# Patient Record
Sex: MALE | Race: WHITE | Employment: OTHER | URBAN - NONMETROPOLITAN AREA
[De-identification: names, ages, dates, MRNs, and addresses within clinical notes are randomized per-mention and may not be internally consistent; named-entity substitution may affect disease eponyms.]

---

## 2022-01-31 RX ORDER — CYCLOBENZAPRINE HCL 10 MG
10 TABLET ORAL 3 TIMES DAILY PRN
COMMUNITY
Start: 2021-12-04 | End: 2022-02-01

## 2022-01-31 RX ORDER — LISINOPRIL 20 MG/1
20 TABLET ORAL DAILY
COMMUNITY

## 2022-01-31 RX ORDER — TAMSULOSIN HYDROCHLORIDE 0.4 MG/1
0.4 CAPSULE ORAL DAILY
COMMUNITY
Start: 2022-01-17

## 2022-01-31 RX ORDER — METOPROLOL TARTRATE 50 MG/1
50 TABLET, FILM COATED ORAL 2 TIMES DAILY
COMMUNITY

## 2022-01-31 RX ORDER — HYDROCHLOROTHIAZIDE 25 MG/1
25 TABLET ORAL DAILY
COMMUNITY

## 2022-01-31 RX ORDER — FENOFIBRATE 145 MG/1
145 TABLET, COATED ORAL DAILY
COMMUNITY

## 2022-02-01 ENCOUNTER — OFFICE VISIT (OUTPATIENT)
Dept: NEPHROLOGY | Age: 67
End: 2022-02-01
Payer: MEDICARE

## 2022-02-01 VITALS
DIASTOLIC BLOOD PRESSURE: 65 MMHG | SYSTOLIC BLOOD PRESSURE: 139 MMHG | HEART RATE: 63 BPM | OXYGEN SATURATION: 95 % | BODY MASS INDEX: 40.74 KG/M2 | WEIGHT: 313 LBS

## 2022-02-01 DIAGNOSIS — N28.1 RENAL CYST, LEFT: ICD-10-CM

## 2022-02-01 DIAGNOSIS — N18.32 STAGE 3B CHRONIC KIDNEY DISEASE (HCC): Primary | ICD-10-CM

## 2022-02-01 DIAGNOSIS — I12.9 HYPERTENSIVE RENAL DISEASE, STAGE 1 THROUGH STAGE 4 OR UNSPECIFIED CHRONIC KIDNEY DISEASE: ICD-10-CM

## 2022-02-01 PROBLEM — R39.12 BENIGN PROSTATIC HYPERPLASIA WITH WEAK URINARY STREAM: Status: ACTIVE | Noted: 2021-12-16

## 2022-02-01 PROBLEM — I48.91 ATRIAL FIBRILLATION WITH RAPID VENTRICULAR RESPONSE (HCC): Status: ACTIVE | Noted: 2018-09-06

## 2022-02-01 PROBLEM — N40.1 BENIGN PROSTATIC HYPERPLASIA WITH WEAK URINARY STREAM: Status: ACTIVE | Noted: 2021-12-16

## 2022-02-01 PROCEDURE — 99204 OFFICE O/P NEW MOD 45 MIN: CPT | Performed by: INTERNAL MEDICINE

## 2022-02-01 PROCEDURE — 1123F ACP DISCUSS/DSCN MKR DOCD: CPT | Performed by: INTERNAL MEDICINE

## 2022-02-01 PROCEDURE — G8427 DOCREV CUR MEDS BY ELIG CLIN: HCPCS | Performed by: INTERNAL MEDICINE

## 2022-02-01 PROCEDURE — G8484 FLU IMMUNIZE NO ADMIN: HCPCS | Performed by: INTERNAL MEDICINE

## 2022-02-01 PROCEDURE — 3017F COLORECTAL CA SCREEN DOC REV: CPT | Performed by: INTERNAL MEDICINE

## 2022-02-01 PROCEDURE — G8419 CALC BMI OUT NRM PARAM NOF/U: HCPCS | Performed by: INTERNAL MEDICINE

## 2022-02-01 PROCEDURE — 1036F TOBACCO NON-USER: CPT | Performed by: INTERNAL MEDICINE

## 2022-02-01 PROCEDURE — 4040F PNEUMOC VAC/ADMIN/RCVD: CPT | Performed by: INTERNAL MEDICINE

## 2022-02-01 RX ORDER — CEPHALEXIN 500 MG/1
500 CAPSULE ORAL 4 TIMES DAILY
COMMUNITY
End: 2022-08-02 | Stop reason: DRUGHIGH

## 2022-02-01 NOTE — PROGRESS NOTES
[facility]  [address] [facility] [address]  Dept: [phone]  Loc: [phone]  Outpatient Consult Note  2/1/2022 3:00 PM        Pt Name:    Pam Stock  YOB: 1955  Primary Care Physician:  Jacquelyn Cosby MD     Chief Complaint:   Chief Complaint   Patient presents with    New Patient     Yamilet MILLARD Renal Dysfunction        Background Information/HPI   The patient is a 75 y.o. with hesitancy, BPH who was recently dxed with renal cyst and elevated serum creatinine. Patient is now following with urology for BPH and renal cyst. He says he has lot of hardware in the back and reports he cannot do MRI. Pt reports some nocturia, urgency and hesitancy. Was started on Flomax and that has improved his symptoms. He has hx HTN for several years. No hx smoking. No hx lung problems. No hx CVA. No hx kidney stones. Has hx atrial fibrillation s/p ablation. He follows with Dr. Krishna Diaz No leg swelling. He denies any urinary complaints. He takes tylenol. Per wife he was taking mobic for few months. Patient had left hip replacement in 2016 and has had multiple surgeries since then. Now taking keflex. He says he had hip infection and had spacer in place. Now has been taking keflex for two years.        Allergies:  Morphine, Nitroglycerin, and Hydromorphone        Past Medical History:  Past Medical History:   Diagnosis Date    A-fib ([address]     Anesthesia     \"violent when awakens\"    Arthritis     Hypertension     Nausea & vomiting         Past Surgical History:  Past Surgical History:   Procedure Laterality Date    ABLATION OF DYSRHYTHMIC FOCUS      BACK SURGERY      CERVICAL [address] SURGERY  2012    COLONOSCOPY      ELBOW SURGERY Right 2009    HERNIA REPAIR  2011    HIP SURGERY      5    NECK SURGERY      SHOULDER ARTHROSCOPY Left 2010, 2011        Family History:  Family History   Problem Relation Age of Onset    Diabetes Mother     Amyotrophic lateral sclerosis Mother     Heart Disease Father         CHF    Diabetes Sister         Social History:  Social History     Socioeconomic History    Marital status:      Spouse name: Not on file    Number of children: Not on file    Years of education: Not on file    Highest education level: Not on file   Occupational History    Not on file   Tobacco Use    Smoking status: Never Smoker    Smokeless tobacco: Never Used   Substance and Sexual Activity    Alcohol use: No    Drug use: No    Sexual activity: Not on file   Other Topics Concern    Not on file   Social History Narrative    Not on file     Social Determinants of Health     Financial Resource Strain:     Difficulty of Paying Living Expenses: Not on file   Food Insecurity:     Worried About [address] in the Last Year: Not on file    Julia of Food in the Last Year: Not on file   Transportation Needs:     Lack of Transportation (Medical): Not on file    Lack of Transportation (Non-Medical):  Not on file   Physical Activity:     Days of Exercise per Week: Not on file    Minutes of Exercise per Session: Not on file   Stress:     Feeling of Stress : Not on file   Social Connections:     Frequency of Communication with Friends and Family: Not on file    Frequency of Social Gatherings with Friends and Family: Not on file    Attends Spiritism Services: Not on file    Active Member of [address] [place] [address] [employer] or Organizations: Not on file    Attends Club or Organization Meetings: Not on file    Marital Status: Not on file   Intimate Partner Violence:     Fear of Current or Ex-Partner: Not on file    Emotionally Abused: Not on file    Physically Abused: Not on file    Sexually Abused: Not on file   Housing Stability:     Unable to Pay for Housing in the Last Year: Not on file    Number of [place] in the Last Year: Not on file    Unstable Housing in the Last Year: Not on file     Farming  No smoking  Social Alcohol     Review of Systems:  Constitutional: no fever or chills  Head: No headaches  Eyes: no blurry vision, no discharge  Ears: no ear pain or hearing changes  Nose: no runny nose or epistaxis  Respiratory: no shortness of breath or cough or sputum production  Cardiovascular: no chest pain  GI: no nausea, no vomiting or diarrhea  : denies any discharge  Skin: no rash  Musculoskeletal: + diffuse joint pains  Neuro: no numbness or tingling, no slurred speech  Psychiatric: stable mood, no depression or insomnia    All other review of systems were reviewed and negative     Home Medications:  Prior to Admission medications    Medication Sig Start Date End Date Taking? Authorizing Provider   cephALEXin (KEFLEX) 500 MG capsule Take 500 mg by mouth 4 times daily   Yes Historical Provider, MD   fenofibrate (TRICOR) 145 MG tablet Take 145 mg by mouth daily   Yes Historical Provider, MD   hydroCHLOROthiazide (HYDRODIURIL) 25 MG tablet Take 25 mg by mouth daily   Yes Historical Provider, MD   lisinopril (PRINIVIL;ZESTRIL) 20 MG tablet Take 20 mg by mouth daily   Yes Historical Provider, MD   metoprolol tartrate (LOPRESSOR) 50 MG tablet Take 50 mg by mouth 2 times daily   Yes Historical Provider, MD   tamsulosin (FLOMAX) 0.4 MG capsule Take 0.4 mg by mouth daily 1/17/22  Yes Historical Provider, MD   rivaroxaban (XARELTO) 20 MG TABS tablet Take 20 mg by mouth Daily with supper   Yes Historical Provider, MD        Physical Examination:  VITALS:  /65 (Site: Right Upper Arm, Position: Sitting, Cuff Size: Large Adult)   Pulse 63   Wt (!) 313 lb (142 kg)   SpO2 95%   BMI 40.74 kg/m²   Body mass index is 40.74 kg/m².   Wt Readings from Last 3 Encounters:   02/01/22 (!) 313 lb (142 kg)   11/25/13 211 lb (95.7 kg)     Constitutional and General Appearance: alert and cooperative with exam, appears comfortable, no distress, not diaphoretic  Eyes: no icteric sclera in left eye or right eye  Ears and Nose: normal external appearance of left and right ear  Neck: No jugular venous distention, appears symmetric, good ROM  Lungs: Air entry B/L, no crackles or rales, no use of accessory muscles or labored breathing  Heart: regular rate, S1, S2  Extremities: No pitting LE edema, no tenderness  GI: soft, non-tender, no guarding, no distention, +obese  Skin: no rash seen on exposed extremities, warm to touch  Musculo: moves all extremities  Neuro: no slurred speech, no facial drooping  Psychiatric: Normal mood and affect, Not agitated     Laboratory & Diagnostics:  Old progress notes from referring physician reviewed. Radiology/US kidneys:   Renal US: 2.3 cm left renal cyst  CT abd: 3 cm left renal cyst  Echo:   Old labs reviewed:  Feb 2020: Creat 1.5  Jan 31, 2022: Creat 1.5     Impression/Plan:   1. CKD IIIb: secondary to likely HTN and previous NSAID use. He does not drink much water. Will check UA, urine protein-creatinine ratio. Avoid NSAIDs. BP is reported to be under good control now. Advised lifestyle modification, weight loss. 2. HTN: on lisinopril, HCTZ and lopressor. Advised low salt diet. 3. BPH: on Flomax  4. Obesity  5. Renal cyst: urology following. US showed caliectasis vs parapelvic cyst left kidney 2.3 cm. No hydronephrosis. CT abdomen noted shows stable renal cyst. Will repeat US in 6 months. 6. Atrial Fibrillation: on Xarelto, seeing Dr. Ara Matthew. On lopressor as well. Orders Placed This Encounter   Procedures    US RENAL COMPLETE    Basic Metabolic Panel    Hemoglobin and Hematocrit, Blood    Vitamin D 25 Hydroxy    PTH, Intact    Phosphorus    Protein / creatinine ratio, urine    Urinalysis     Return in about 6 months (around 8/1/2022).     Thought process was discussed with the patient  Thank you for the referral  Please do not hesitate to contact me if you have any questions or concerns  I will make further recommendations depending on clinical course and lab/diagnostic results      Debra Rascon MD  [facility]

## 2022-07-14 DIAGNOSIS — N18.32 STAGE 3B CHRONIC KIDNEY DISEASE (HCC): ICD-10-CM

## 2022-08-02 ENCOUNTER — OFFICE VISIT (OUTPATIENT)
Dept: NEPHROLOGY | Age: 67
End: 2022-08-02
Payer: MEDICARE

## 2022-08-02 VITALS
OXYGEN SATURATION: 94 % | BODY MASS INDEX: 41.52 KG/M2 | HEART RATE: 55 BPM | SYSTOLIC BLOOD PRESSURE: 97 MMHG | WEIGHT: 315 LBS | DIASTOLIC BLOOD PRESSURE: 53 MMHG

## 2022-08-02 DIAGNOSIS — N18.32 CHRONIC KIDNEY DISEASE, STAGE 3B (HCC): Primary | ICD-10-CM

## 2022-08-02 DIAGNOSIS — I12.9 HYPERTENSIVE RENAL DISEASE, STAGE 1 THROUGH STAGE 4 OR UNSPECIFIED CHRONIC KIDNEY DISEASE: ICD-10-CM

## 2022-08-02 DIAGNOSIS — N28.1 SIMPLE RENAL CYST: ICD-10-CM

## 2022-08-02 PROCEDURE — 99213 OFFICE O/P EST LOW 20 MIN: CPT | Performed by: INTERNAL MEDICINE

## 2022-08-02 PROCEDURE — G8427 DOCREV CUR MEDS BY ELIG CLIN: HCPCS | Performed by: INTERNAL MEDICINE

## 2022-08-02 PROCEDURE — 3017F COLORECTAL CA SCREEN DOC REV: CPT | Performed by: INTERNAL MEDICINE

## 2022-08-02 PROCEDURE — G8419 CALC BMI OUT NRM PARAM NOF/U: HCPCS | Performed by: INTERNAL MEDICINE

## 2022-08-02 PROCEDURE — 1123F ACP DISCUSS/DSCN MKR DOCD: CPT | Performed by: INTERNAL MEDICINE

## 2022-08-02 PROCEDURE — 1036F TOBACCO NON-USER: CPT | Performed by: INTERNAL MEDICINE

## 2022-08-02 RX ORDER — CEPHALEXIN 500 MG/1
500 CAPSULE ORAL DAILY
Qty: 90 CAPSULE | Refills: 0 | Status: SHIPPED
Start: 2022-08-02

## 2022-08-02 NOTE — PROGRESS NOTES
[facility]  [address] [facility] [address]  Dept: [phone]  Loc: [phone]  Office Progress Note  8/2/2022 1:12 PM      Pt Name:    David Deluna  YOB: 1955  Primary Care Physician:  VENICE Bhakta, PA     Chief Complaint:   Chief Complaint   Patient presents with    Chronic Kidney Disease     Stage 3        Background Information/Interval History:   The patient is a 75 y.o. with hesitancy, BPH who was recently dxed with renal cyst and elevated serum creatinine. Patient is now following with urology for BPH and renal cyst. He says he has lot of hardware in the back and reports he cannot do MRI. He has hx HTN for several years. Has hx atrial fibrillation s/p ablation. He follows with Dr. Deb Mujica. Per wife he was taking mobic for few months. Patient had left hip replacement in 2016 and has had multiple surgeries since then. Now taking keflex. He says he had hip infection and had spacer in place. Now has been taking keflex for two years. He says he feels well. No dizziness or chest pain. BP in high 90s. Says it is usually in 120-130 range. He says he feels fine. No urinary complaints. No chest pain or shortness of breath.   Pt says he takes Keflex 500 mg daily (not four times a day- follows with Dr. Jose Gaines (ID service, [address] [place] [address])     Past History:  Past Medical History:   Diagnosis Date    A-fib [facility])     Anesthesia     \"violent when awakens\"    Arthritis     Hypertension     Nausea & vomiting      Past Surgical History:   Procedure Laterality Date    ABLATION OF DYSRHYTHMIC FOCUS      BACK SURGERY      CERVICAL [address] SURGERY  2012    COLONOSCOPY      ELBOW SURGERY Right 2009    HERNIA REPAIR  2011    HIP SURGERY      5    NECK SURGERY      SHOULDER ARTHROSCOPY Left 2010, 2011        VITALS:  BP (!) 97/53 (Site: Left Upper Arm, Position: Sitting, Cuff Size: Medium Adult)   Pulse 55   Wt (!) 319 lb (144.7 kg)   SpO2 94%   BMI 41.52 kg/m²   Wt Readings from Last 3 Encounters:   08/02/22 (!) 319 lb (144.7 kg)   02/01/22 (!) 313 lb (142 kg)   11/25/13 211 lb (95.7 kg)     Body mass index is 41.52 kg/m². General Appearance: alert and cooperative with exam, appears comfortable, no distress  Oral: moist oral mucus membranes  Neck: No jugular venous distention  Lungs: Air entry B/L, no crackles or rales, no use of accessory muscles  Heart: S1, S2 heard  GI: soft, non-tender, no guarding  Extremities: No sig LE edema     Medications:  Current Outpatient Medications   Medication Sig Dispense Refill    cephALEXin (KEFLEX) 500 MG capsule Take 500 mg by mouth 4 times daily      fenofibrate (TRICOR) 145 MG tablet Take 145 mg by mouth daily      hydroCHLOROthiazide (HYDRODIURIL) 25 MG tablet Take 25 mg by mouth daily      lisinopril (PRINIVIL;ZESTRIL) 20 MG tablet Take 20 mg by mouth daily      metoprolol tartrate (LOPRESSOR) 50 MG tablet Take 50 mg by mouth 2 times daily      tamsulosin (FLOMAX) 0.4 MG capsule Take 0.4 mg by mouth daily      rivaroxaban (XARELTO) 20 MG TABS tablet Take 20 mg by mouth Daily with supper       No current facility-administered medications for this visit. Laboratory & Diagnostics:  Old progress notes from referring physician reviewed. Radiology/US kidneys:   Renal US: 2.3 cm left renal cyst  CT abd: 3 cm left renal cyst  Echo:  Old labs reviewed:  Feb 2020: Creat 1.5  Jan 31, 2022: Creat 1.5    July 2022: UPCR 100 mg/g, UA: (-)b/p, BUN 42, Creat 1.8, K 4.8     Impression/Plan:   1. CKD IIIb: secondary to likely HTN and previous NSAID use. Creat is 1.8. Advised weight loss. Not much proteinuria. Advised to increase water intake and call with some BP readings in 3-4 days. Creat is little higher- this could be due to low BP. He will call me with some BP readings in 3-4 days. Discussed with patient and he agreed. Discussed with family member. 2. Simple cyst: monitor.  Following urology. 3. HTN: on lisinopril, HCTZ and lopressor. BP is usually stable at home. Advised to monitor and call if staying low. He says he feels just fine. He will call us with some BP readings in 3 days. May need to stop HCTZ and repeat labs depending on repeat BP readings in 3-5 days. 4. BPH: on Flomax  5. Obesity  6. Renal cyst: urology following. US showed caliectasis vs parapelvic cyst left kidney 2.3 cm. No hydronephrosis. CT abdomen noted shows stable renal cyst. US shows simple cyst.   7. Atrial Fibrillation: on Xarelto, seeing Dr. Savanna Boyce. On lopressor as well. 8. Chronic prophylaxis    Orders Placed This Encounter   Procedures    Basic Metabolic Panel     Return in about 6 months (around 2/2/2023).     Janett Woods MD  [facility]

## 2023-02-10 ENCOUNTER — OFFICE VISIT (OUTPATIENT)
Dept: NEPHROLOGY | Age: 68
End: 2023-02-10
Payer: MEDICARE

## 2023-02-10 VITALS
WEIGHT: 310 LBS | BODY MASS INDEX: 40.35 KG/M2 | OXYGEN SATURATION: 99 % | SYSTOLIC BLOOD PRESSURE: 114 MMHG | DIASTOLIC BLOOD PRESSURE: 75 MMHG | HEART RATE: 57 BPM

## 2023-02-10 DIAGNOSIS — N18.31 CHRONIC KIDNEY DISEASE, STAGE 3A (HCC): Primary | ICD-10-CM

## 2023-02-10 DIAGNOSIS — N28.1 RENAL CYST: ICD-10-CM

## 2023-02-10 DIAGNOSIS — I12.9 HYPERTENSIVE RENAL DISEASE, STAGE 1 THROUGH STAGE 4 OR UNSPECIFIED CHRONIC KIDNEY DISEASE: ICD-10-CM

## 2023-02-10 PROCEDURE — G8427 DOCREV CUR MEDS BY ELIG CLIN: HCPCS | Performed by: INTERNAL MEDICINE

## 2023-02-10 PROCEDURE — 1123F ACP DISCUSS/DSCN MKR DOCD: CPT | Performed by: INTERNAL MEDICINE

## 2023-02-10 PROCEDURE — G8419 CALC BMI OUT NRM PARAM NOF/U: HCPCS | Performed by: INTERNAL MEDICINE

## 2023-02-10 PROCEDURE — 99213 OFFICE O/P EST LOW 20 MIN: CPT | Performed by: INTERNAL MEDICINE

## 2023-02-10 PROCEDURE — 3017F COLORECTAL CA SCREEN DOC REV: CPT | Performed by: INTERNAL MEDICINE

## 2023-02-10 PROCEDURE — 3074F SYST BP LT 130 MM HG: CPT | Performed by: INTERNAL MEDICINE

## 2023-02-10 PROCEDURE — 1036F TOBACCO NON-USER: CPT | Performed by: INTERNAL MEDICINE

## 2023-02-10 PROCEDURE — G8484 FLU IMMUNIZE NO ADMIN: HCPCS | Performed by: INTERNAL MEDICINE

## 2023-02-10 PROCEDURE — 3078F DIAST BP <80 MM HG: CPT | Performed by: INTERNAL MEDICINE

## 2023-02-10 RX ORDER — TRAMADOL HYDROCHLORIDE 50 MG/1
50 TABLET ORAL EVERY 8 HOURS PRN
COMMUNITY

## 2023-02-10 NOTE — PROGRESS NOTES
[address] [facility] [address]  Dept: [phone]  Loc: [phone]  Office Progress Note  2/10/2023 9:57 AM      Pt Name:    Roberto Carlos Mark  YOB: 1955  Primary Care Physician:  VENICE Latif, PA     Chief Complaint:   Chief Complaint   Patient presents with    Follow-up     6 month follow-up for CKD        Background Information/Interval History:   The patient is a 70 y.o. with hesitancy, BPH who was recently dxed with renal cyst and elevated serum creatinine. Patient is now following with urology for BPH and renal cyst. He says he has lot of hardware in the back and reports he cannot do MRI. He has hx HTN for several years. Has hx atrial fibrillation s/p ablation. He follows with Dr. Leandro Zamora. Per wife he was taking mobic for few months. Patient had left hip replacement in 2016 and has had multiple surgeries since then. Patient has history of hip infection. Previously had spacer. He follows with ID Dr. Rivera Piper in [facility]. He is on Keflex per ID. Pt is here for follow-up. Doing okay. BP is 120s at home. No urinary complaints. No chest pain or shortness of breath. He has no complaints.   Blood pressure is 114/75     Past History:  Past Medical History:   Diagnosis Date    A-fib ([address]     Anesthesia     \"violent when awakens\"    Arthritis     Hypertension     Nausea & vomiting      Past Surgical History:   Procedure Laterality Date    ABLATION OF DYSRHYTHMIC FOCUS      BACK SURGERY      CERVICAL DISC SURGERY  2012    COLONOSCOPY      ELBOW SURGERY Right 2009    HERNIA REPAIR  2011    HIP SURGERY      5    NECK SURGERY      SHOULDER ARTHROSCOPY Left 2010, 2011        VITALS:  /75 (Site: Left Upper Arm, Position: Sitting, Cuff Size: Large Adult)   Pulse 57   Wt (!) 310 lb (140.6 kg)   SpO2 99%   BMI 40.35 kg/m²   Wt Readings from Last 3 Encounters:   02/10/23 (!) 310 lb (140.6 kg)   08/02/22 (!) 319 lb (144.7 kg)   02/01/22 (!) 313 lb (142 kg)     Body mass index is 40.35 kg/m². General Appearance: alert and cooperative with exam, appears comfortable, no distress  Oral: moist oral mucus membranes  Neck: No jugular venous distention  Lungs: Air entry B/L, no crackles or rales, no use of accessory muscles  Heart: S1, S2 heard  GI: soft, non-tender, no guarding  Extremities: No sig LE edema     Medications:  Current Outpatient Medications   Medication Sig Dispense Refill    traMADol (ULTRAM) 50 MG tablet Take 50 mg by mouth every 8 hours as needed for Pain. cephALEXin (KEFLEX) 500 MG capsule Take 1 capsule by mouth in the morning. 90 capsule 0    fenofibrate (TRICOR) 145 MG tablet Take 145 mg by mouth daily      hydroCHLOROthiazide (HYDRODIURIL) 25 MG tablet Take 25 mg by mouth daily      lisinopril (PRINIVIL;ZESTRIL) 20 MG tablet Take 20 mg by mouth daily      metoprolol tartrate (LOPRESSOR) 50 MG tablet Take 50 mg by mouth 2 times daily      tamsulosin (FLOMAX) 0.4 MG capsule Take 0.4 mg by mouth daily      rivaroxaban (XARELTO) 20 MG TABS tablet Take 20 mg by mouth Daily with supper       No current facility-administered medications for this visit. Laboratory & Diagnostics:  Old progress notes from referring physician reviewed. Radiology/US kidneys:   Renal US: 2.3 cm left renal cyst  CT abd: 3 cm left renal cyst  Echo:  Old labs reviewed:  Feb 2020: Creat 1.5  Jan 31, 2022: Creat 1.5    July 2022: UPCR 100 mg/g, UA: (-)b/p, BUN 42, Creat 1.8, K 4.8  Feb 2023: K 4.1, Creat 1.40, Ca 9.7     Impression/Plan:   1. CKD IIIa: secondary to likely HTN and previous NSAID use. Creat is 1.4. Overall stable renal function. 2. Simple cyst: monitor. Following urology. 3. HTN: on lisinopril, HCTZ and lopressor. Blood pressure stable  4. BPH: on Flomax  5. Obesity  6. Renal cyst: urology following. US showed caliectasis vs parapelvic cyst left kidney 2.3 cm. No hydronephrosis.  CT abdomen noted shows stable renal cyst. US shows simple cyst in July 2022. Will repeat US in Jan 2024.   7. Atrial Fibrillation: on Xarelto, seeing Dr. Maribel Shi. 8. Chronic prophylaxis    Orders Placed This Encounter   Procedures    US RENAL COMPLETE    Basic Metabolic Panel    Protein / creatinine ratio, urine     Return in about 1 year (around 2/10/2024).     Cesar Bunn MD  [facility]

## 2024-02-09 ENCOUNTER — OFFICE VISIT (OUTPATIENT)
Dept: NEPHROLOGY | Age: 69
End: 2024-02-09
Payer: MEDICARE

## 2024-02-09 VITALS
BODY MASS INDEX: 41.24 KG/M2 | HEART RATE: 57 BPM | OXYGEN SATURATION: 96 % | SYSTOLIC BLOOD PRESSURE: 99 MMHG | WEIGHT: 311.2 LBS | HEIGHT: 73 IN | DIASTOLIC BLOOD PRESSURE: 73 MMHG

## 2024-02-09 DIAGNOSIS — I12.9 HYPERTENSIVE RENAL DISEASE, STAGE 1 THROUGH STAGE 4 OR UNSPECIFIED CHRONIC KIDNEY DISEASE: ICD-10-CM

## 2024-02-09 DIAGNOSIS — N28.1 RENAL CYST: ICD-10-CM

## 2024-02-09 DIAGNOSIS — N18.31 CHRONIC KIDNEY DISEASE, STAGE 3A (HCC): Primary | ICD-10-CM

## 2024-02-09 PROCEDURE — G8427 DOCREV CUR MEDS BY ELIG CLIN: HCPCS | Performed by: INTERNAL MEDICINE

## 2024-02-09 PROCEDURE — 1036F TOBACCO NON-USER: CPT | Performed by: INTERNAL MEDICINE

## 2024-02-09 PROCEDURE — 3078F DIAST BP <80 MM HG: CPT | Performed by: INTERNAL MEDICINE

## 2024-02-09 PROCEDURE — 3017F COLORECTAL CA SCREEN DOC REV: CPT | Performed by: INTERNAL MEDICINE

## 2024-02-09 PROCEDURE — 3074F SYST BP LT 130 MM HG: CPT | Performed by: INTERNAL MEDICINE

## 2024-02-09 PROCEDURE — 1123F ACP DISCUSS/DSCN MKR DOCD: CPT | Performed by: INTERNAL MEDICINE

## 2024-02-09 PROCEDURE — G8484 FLU IMMUNIZE NO ADMIN: HCPCS | Performed by: INTERNAL MEDICINE

## 2024-02-09 PROCEDURE — 99213 OFFICE O/P EST LOW 20 MIN: CPT | Performed by: INTERNAL MEDICINE

## 2024-02-09 PROCEDURE — G8417 CALC BMI ABV UP PARAM F/U: HCPCS | Performed by: INTERNAL MEDICINE

## 2024-02-09 NOTE — PROGRESS NOTES
[facility]  [facility]  [address]  [facility] [address]  Dept: [phone]  Loc: [phone]  Office Progress Note  2/9/2024 9:43 AM      Pt Name:    Anders Mars  YOB: 1955  Primary Care Physician:  Ovi Monique DO     Chief Complaint:   Chief Complaint   Patient presents with    Follow-up     12 month follow-up for CKD        Background Information/Interval History:   The patient is a 69 y.o. with hesitancy, BPH who was recently dxed with renal cyst and elevated serum creatinine. Patient is now following with urology for BPH and renal cyst. He says he has lot of hardware in the back and reports he cannot do MRI. He has hx HTN for several years.  Has hx atrial fibrillation s/p ablation. He follows with Dr. Aceves. Per wife he was taking mobic for few months. Patient had left hip replacement in 2016 and has had multiple surgeries since then.  Patient has history of hip infection.  Previously had spacer.  He follows with ID Dr. Cosby in [place].  He is on Keflex per ID.    Pt is here for follow-up. No urinary complaints. Usually BP at home is in 120-130/75-80 range. No complaints. He feels well.      Past History:  Past Medical History:   Diagnosis Date    A-fib (HCC)     Anesthesia     \"violent when awakens\"    Arthritis     Hypertension     Nausea & vomiting      Past Surgical History:   Procedure Laterality Date    ABLATION OF DYSRHYTHMIC FOCUS      BACK SURGERY      CERVICAL DISC SURGERY  2012    COLONOSCOPY      ELBOW SURGERY Right 2009    HERNIA REPAIR  2011    HIP SURGERY      5    NECK SURGERY      SHOULDER ARTHROSCOPY Left 2010, 2011        VITALS:  BP 99/73 (Site: Left Upper Arm, Position: Sitting, Cuff Size: Large Adult)   Pulse 57   Ht 1.854 m (6' 1\")   Wt (!) 141.2 kg (311 lb 3.2 oz)   SpO2 96%   BMI 41.06 kg/m²   Wt Readings from Last 3 Encounters:   02/09/24 (!) 141.2 kg (311 lb 3.2 oz)   02/10/23 (!)

## 2025-02-05 ENCOUNTER — OFFICE VISIT (OUTPATIENT)
Dept: NEPHROLOGY | Age: 70
End: 2025-02-05
Payer: MEDICARE

## 2025-02-05 VITALS
HEART RATE: 52 BPM | BODY MASS INDEX: 39.23 KG/M2 | OXYGEN SATURATION: 99 % | HEIGHT: 73 IN | WEIGHT: 296 LBS | SYSTOLIC BLOOD PRESSURE: 117 MMHG | DIASTOLIC BLOOD PRESSURE: 81 MMHG

## 2025-02-05 DIAGNOSIS — N18.31 CHRONIC KIDNEY DISEASE, STAGE 3A (HCC): Primary | ICD-10-CM

## 2025-02-05 DIAGNOSIS — I12.9 HYPERTENSIVE RENAL DISEASE, STAGE 1 THROUGH STAGE 4 OR UNSPECIFIED CHRONIC KIDNEY DISEASE: ICD-10-CM

## 2025-02-05 DIAGNOSIS — N28.1 RENAL CYST: ICD-10-CM

## 2025-02-05 PROCEDURE — 99213 OFFICE O/P EST LOW 20 MIN: CPT | Performed by: INTERNAL MEDICINE

## 2025-02-05 PROCEDURE — 3074F SYST BP LT 130 MM HG: CPT | Performed by: INTERNAL MEDICINE

## 2025-02-05 PROCEDURE — 1123F ACP DISCUSS/DSCN MKR DOCD: CPT | Performed by: INTERNAL MEDICINE

## 2025-02-05 PROCEDURE — 3079F DIAST BP 80-89 MM HG: CPT | Performed by: INTERNAL MEDICINE

## 2025-02-05 PROCEDURE — 1159F MED LIST DOCD IN RCRD: CPT | Performed by: INTERNAL MEDICINE

## 2025-02-05 NOTE — PROGRESS NOTES
[facility]  [facility]  [address]  [facility] [address]  Dept: [phone]  Loc: [phone]  Office Progress Note  2/5/2025 10:04 AM      Pt Name:    Anders Mars  YOB: 1955  Primary Care Physician:  No primary care provider on file.     Chief Complaint:   Chief Complaint   Patient presents with    Follow-up     12 month follow-up for CKD        Background Information/Interval History:   The patient is a 70 y.o. with hesitancy, BPH who was  dxed with renal cyst and elevated serum creatinine. Patient is now following with urology for BPH and renal cyst. He says he has lot of hardware in the back and reports he cannot do MRI. He has hx HTN for several years.  Has hx atrial fibrillation s/p ablation. He follows with Dr. Aceves. Per wife previously he was taking mobic in the past. Patient had left hip replacement in 2016 and has had multiple surgeries since then.  Patient has had history of hip infection.  He follows with ID Dr. Cosby in [place].      Pt is here for follow-up. Usually BP at home is in 120-130/75-80 range. No complaints. He feels well. He is here for 1 year CKD follow-up     Past History:  Past Medical History:   Diagnosis Date    A-fib (HCC)     Anesthesia     \"violent when awakens\"    Arthritis     Hypertension     Nausea & vomiting      Past Surgical History:   Procedure Laterality Date    ABLATION OF DYSRHYTHMIC FOCUS      BACK SURGERY      CERVICAL DISC SURGERY  2012    COLONOSCOPY      ELBOW SURGERY Right 2009    HERNIA REPAIR  2011    HIP SURGERY      5    NECK SURGERY      SHOULDER ARTHROSCOPY Left 2010, 2011        VITALS:  /81 (Site: Left Upper Arm, Position: Sitting, Cuff Size: Large Adult)   Pulse 52   Ht 1.854 m (6' 1\")   Wt 134.3 kg (296 lb)   SpO2 99%   BMI 39.05 kg/m²   Wt Readings from Last 3 Encounters:   02/05/25 134.3 kg (296 lb)   02/09/24 (!) 141.2 kg (311 lb 3.2 oz)   02/10/23